# Patient Record
Sex: FEMALE | Race: OTHER | NOT HISPANIC OR LATINO | URBAN - METROPOLITAN AREA
[De-identification: names, ages, dates, MRNs, and addresses within clinical notes are randomized per-mention and may not be internally consistent; named-entity substitution may affect disease eponyms.]

---

## 2018-08-31 ENCOUNTER — EMERGENCY (EMERGENCY)
Facility: HOSPITAL | Age: 63
LOS: 1 days | Discharge: HOME | End: 2018-08-31
Attending: EMERGENCY MEDICINE
Payer: SELF-PAY

## 2018-08-31 VITALS
SYSTOLIC BLOOD PRESSURE: 109 MMHG | HEIGHT: 63 IN | HEART RATE: 95 BPM | TEMPERATURE: 99 F | RESPIRATION RATE: 18 BRPM | OXYGEN SATURATION: 98 % | DIASTOLIC BLOOD PRESSURE: 73 MMHG | WEIGHT: 100.09 LBS

## 2018-08-31 PROCEDURE — 99284 EMERGENCY DEPT VISIT MOD MDM: CPT

## 2018-08-31 PROCEDURE — 93971 EXTREMITY STUDY: CPT

## 2018-08-31 PROCEDURE — 99284 EMERGENCY DEPT VISIT MOD MDM: CPT | Mod: 25

## 2018-08-31 PROCEDURE — 93971 EXTREMITY STUDY: CPT | Mod: 26,RT

## 2018-08-31 RX ORDER — IBUPROFEN 200 MG
800 TABLET ORAL ONCE
Qty: 0 | Refills: 0 | Status: COMPLETED | OUTPATIENT
Start: 2018-08-31 | End: 2018-08-31

## 2018-08-31 RX ADMIN — Medication 800 MILLIGRAM(S): at 20:44

## 2018-08-31 NOTE — ED STATDOCS - PROGRESS NOTE DETAILS
Case hand off at 11 pm, check US, reassess, disposition by ACP--Arron. history and physical performed by intake physician - results reviewed and case discussed with attending

## 2018-08-31 NOTE — ED ADULT NURSE NOTE - CHPI ED NUR SYMPTOMS NEG
no fever/no dizziness/no vomiting/no tingling/no nausea/no decreased eating/drinking/no chills/no weakness

## 2018-08-31 NOTE — ED STATDOCS - MEDICAL DECISION MAKING DETAILS
Right calf pain; Doppler to r/o DVT. Consider cellulitis for negative. Pain meds, check results and reassess.

## 2018-08-31 NOTE — ED ADULT NURSE NOTE - OBJECTIVE STATEMENT
Pt A&Ox4 c/o right LE pain swelling, hot to touch at this time, took 18 hour flight from \[facility] [place] [facility]\"" and this started. Pt resting comfortably, VSS, no signs of distress at this time.

## 2018-08-31 NOTE — ED ADULT NURSE NOTE - NSIMPLEMENTINTERV_GEN_ALL_ED
Implemented All Universal Safety Interventions:  Points to call system. Call bell, personal items and telephone within reach. Instruct patient to call for assistance. Room bathroom lighting operational. Non-slip footwear when patient is off stretcher. Physically safe environment: no spills, clutter or unnecessary equipment. Stretcher in lowest position, wheels locked, appropriate side rails in place.

## 2018-08-31 NOTE — ED STATDOCS - OBJECTIVE STATEMENT
61 y/o F pt with hx of HTN sent to ED with daughter at bedside by PMD for right calf pain x 4 days to evaluate for DVT. Denies smoking or ETOH. She did not take any medications for treatment of pain. Per daughter, patient was on an 18 hour flight from [facility] 4 days ago. Denies CP, cough, no hemoptysis and SOB. No further complaints at this time.

## 2018-09-01 VITALS
RESPIRATION RATE: 18 BRPM | OXYGEN SATURATION: 100 % | DIASTOLIC BLOOD PRESSURE: 73 MMHG | HEART RATE: 81 BPM | SYSTOLIC BLOOD PRESSURE: 120 MMHG | TEMPERATURE: 97 F

## 2018-09-01 RX ORDER — ASPIRIN/CALCIUM CARB/MAGNESIUM 324 MG
1 TABLET ORAL
Qty: 14 | Refills: 0 | OUTPATIENT
Start: 2018-09-01 | End: 2018-09-14